# Patient Record
Sex: MALE | Employment: PART TIME | URBAN - METROPOLITAN AREA
[De-identification: names, ages, dates, MRNs, and addresses within clinical notes are randomized per-mention and may not be internally consistent; named-entity substitution may affect disease eponyms.]

---

## 2017-04-29 ENCOUNTER — HOSPITAL ENCOUNTER (EMERGENCY)
Facility: CLINIC | Age: 32
Discharge: HOME | End: 2017-04-29
Attending: PHYSICIAN ASSISTANT | Admitting: PHYSICIAN ASSISTANT
Payer: COMMERCIAL

## 2017-04-29 VITALS
TEMPERATURE: 98.3 F | SYSTOLIC BLOOD PRESSURE: 123 MMHG | DIASTOLIC BLOOD PRESSURE: 73 MMHG | OXYGEN SATURATION: 98 % | RESPIRATION RATE: 18 BRPM | HEART RATE: 71 BPM | WEIGHT: 165 LBS

## 2017-04-29 DIAGNOSIS — R07.0 THROAT PAIN: ICD-10-CM

## 2017-04-29 LAB
DEPRECATED S PYO AG THROAT QL EIA: NORMAL
MICRO REPORT STATUS: NORMAL
SPECIMEN SOURCE: NORMAL

## 2017-04-29 PROCEDURE — 87081 CULTURE SCREEN ONLY: CPT | Performed by: PHYSICIAN ASSISTANT

## 2017-04-29 PROCEDURE — 25000125 ZZHC RX 250: Performed by: PHYSICIAN ASSISTANT

## 2017-04-29 PROCEDURE — 87880 STREP A ASSAY W/OPTIC: CPT | Performed by: PHYSICIAN ASSISTANT

## 2017-04-29 PROCEDURE — 99283 EMERGENCY DEPT VISIT LOW MDM: CPT

## 2017-04-29 PROCEDURE — 25000132 ZZH RX MED GY IP 250 OP 250 PS 637: Performed by: PHYSICIAN ASSISTANT

## 2017-04-29 RX ORDER — DEXAMETHASONE SODIUM PHOSPHATE 10 MG/ML
10 INJECTION, SOLUTION INTRAMUSCULAR; INTRAVENOUS ONCE
Status: COMPLETED | OUTPATIENT
Start: 2017-04-29 | End: 2017-04-29

## 2017-04-29 RX ORDER — IBUPROFEN 200 MG
600 TABLET ORAL ONCE
Status: COMPLETED | OUTPATIENT
Start: 2017-04-29 | End: 2017-04-29

## 2017-04-29 RX ADMIN — DEXAMETHASONE SODIUM PHOSPHATE 10 MG: 10 INJECTION, SOLUTION INTRAMUSCULAR; INTRAVENOUS at 19:49

## 2017-04-29 RX ADMIN — IBUPROFEN 600 MG: 200 TABLET, FILM COATED ORAL at 19:15

## 2017-04-29 ASSESSMENT — ENCOUNTER SYMPTOMS
TROUBLE SWALLOWING: 0
SORE THROAT: 1
COUGH: 0
FEVER: 0

## 2017-04-29 NOTE — ED AVS SNAPSHOT
Emergency Department    [address] [facility] [address]    Phone:  [phone]    Fax:  [phone]                                       Mynor Whittington   MRN: [id number]    Department:   Emergency Department   Date of Visit:  4/29/2017           Patient Information     Date Of Birth          1985        Your diagnoses for this visit were:     Throat pain        You were seen by Shelby Cottrell PA-C.      Follow-up Information     Follow up with [facility].    Why:  As needed    Contact information:    [address] [id number] [phone]          Follow up with  Emergency Department.    Specialty:  EMERGENCY MEDICINE    Why:  If symptoms worsen    Contact information:    [address] [facility] [address] [phone]        Discharge Instructions       Discharge Instructions  Sore Throat  You were seen today for a sore throat.  Most sore throats are caused by a virus. Antibiotics do not help with viral infections, but you can fight off the virus on your own.  In this case, your sore throat would be treated with medications for your pain and fever.    Strep throat is a kind of sore throat caused by Group A streptococcus bacteria.  This type of sore throat is treated with antibiotics.  If you had a rapid test done today for strep throat and it did not show infection, we always do a culture. If the culture shows you have strep throat, we will call you and get you a prescription for antibiotics.    Return to the Emergency Department if:    If you have difficulty breathing.    If you are drooling because you are unable to swallow.    You become dehydrated due to difficulty drinking. Signs of dehydration include weakness, dry mouth, and urinating less than 3 times per day.    If you develop swelling of the neck or tongue.    If you develop a high fever with either headache or stiff neck.    Treatment:      Pain relief  "-- Non-prescription pain medications, such as Tylenol  (acetaminophen) or Motrin , Advil  (ibuprofen) are usually recommended for pain.  Do not use a medicine that you are allergic to, or if your doctor has told you not to use it.   If you have been given a narcotic such as Vicodin  (hydrocodone with acetaminophen), Percocet  (oxycodone with acetaminophen), codeine, do not drive for four hours after you have taken it.  If the narcotic contains Tylenol  (acetaminophen), do not take Tylenol  with it. All narcotics will cause constipation, so eat a high fiber diet.      If you have been placed on antibiotics, watch for signs of allergic reaction.  These include rash, lip swelling, difficulty breathing, wheezing, and dizziness.  If you develop any of these symptoms, stop the antibiotic immediately and go to an Emergency Department or Urgent Care for evaluation.    Probiotics: If you have been given an antibiotic, you may want to also take a probiotic pill or eat yogurt with live cultures. Probiotics have \"good bacteria\" to help your intestines stay healthy. Studies have shown that probiotics help prevent diarrhea and other intestine problems (including C. diff infection) when you take antibiotics. You can buy these without a prescription in the pharmacy section of the store.   If you were given a prescription for medicine here today, be sure to read all of the information (including the package insert) that comes with your prescription.  This will include important information about the medicine, its side effects, and any warnings that you need to know about.  The pharmacist who fills the prescription can provide more information and answer questions you may have about the medicine.  If you have questions or concerns that the pharmacist cannot address, please call or return to the Emergency Department.         Remember that you can always come back to the Emergency Department if you are not able to see your regular " doctor in the amount of time listed above, if you get any new symptoms, or if there is anything that worries you.          24 Hour Appointment Hotline       To make an appointment at any [facility], call [phone] ([phone]). If you don't have a family doctor or clinic, we will help you find one. [facility] are conveniently located to serve the needs of you and your family.             Review of your medicines      Notice     You have not been prescribed any medications.            Procedures and tests performed during your visit     Beta strep group A culture    Rapid strep screen      Orders Needing Specimen Collection     None      Pending Results     Date and Time Order Name Status Description    4/29/2017 1907 Beta strep group A culture In process             Pending Culture Results     Date and Time Order Name Status Description    4/29/2017 1907 Beta strep group A culture In process             Test Results From Your Hospital Stay        4/29/2017  7:22 PM      Component Results     Component    Specimen Description    Throat    Rapid Strep A Screen    NEGATIVE: No Group A streptococcal antigen detected by immunoassay, await   culture report.      Micro Report Status    FINAL 04/29/2017 4/29/2017  7:23 PM                Clinical Quality Measure: Blood Pressure Screening     Your blood pressure was checked while you were in the emergency department today. The last reading we obtained was  BP: 123/73 . Please read the guidelines below about what these numbers mean and what you should do about them.  If your systolic blood pressure (the top number) is less than 120 and your diastolic blood pressure (the bottom number) is less than 80, then your blood pressure is normal. There is nothing more that you need to do about it.  If your systolic blood pressure (the top number) is 120-139 or your diastolic blood pressure (the bottom number) is 80-89, your blood pressure may be higher than  "it should be. You should have your blood pressure rechecked within a year by a primary care provider.  If your systolic blood pressure (the top number) is 140 or greater or your diastolic blood pressure (the bottom number) is 90 or greater, you may have high blood pressure. High blood pressure is treatable, but if left untreated over time it can put you at risk for heart attack, stroke, or kidney failure. You should have your blood pressure rechecked by a primary care provider within the next 4 weeks.  If your provider in the emergency department today gave you specific instructions to follow-up with your doctor or provider even sooner than that, you should follow that instruction and not wait for up to 4 weeks for your follow-up visit.        Thank you for choosing [facility]       Thank you for choosing [facility] for your care. Our goal is always to provide you with excellent care. Hearing back from our patients is one way we can continue to improve our services. Please take a few minutes to complete the written survey that you may receive in the mail after you visit with us. Thank you!        [employer] Information     [employer] lets you send messages to your doctor, view your test results, renew your prescriptions, schedule appointments and more. To sign up, go to [email or web address] . Click on \"Log in\" on the left side of the screen, which will take you to the Welcome page. Then click on \"Sign up Now\" on the right side of the page.     You will be asked to enter the access code listed below, as well as some personal information. Please follow the directions to create your username and password.     Your access code is: [email or web address]  Expires: 2017  7:48 PM     Your access code will  in 90 days. If you need help or a new code, please call your [facility] clinic or [phone].        Care EveryWhere ID     This is your Care EveryWhere ID. This could be used by other organizations to access your [facility] " medical records  [id number]        After Visit Summary       This is your record. Keep this with you and show to your community pharmacist(s) and doctor(s) at your next visit.

## 2017-04-29 NOTE — ED PROVIDER NOTES
History     Chief Complaint:  Pharyngitis    HPI   Mynor Whittington is a 32 year old male who presents with pharyngitis. He states that for the past two days he has and a dry, sore throat, accompanied with congestion. He may have had sick contact with a co-worker, though is unsure of his co-workers symptoms. There has not been any fevers at home, nor ear pain or cough, no difficulty swallowing or voice changes. He denies any other symptoms or concerns.     Allergies:  No Known Drug Allergies      Medications:    The patient is not currently taking any prescribed medications.     Past Medical History:    The patient denies any relevant past medical history.     Past Surgical History:    History reviewed. No pertinent past surgical history.     Family History:    The patient denies any relevant family medical history.     Social History:  The patient presented to the ED alone.  Smoking Status: Never smoker  Smokeless Tobacco: No  Alcohol Use: No   Marital Status:  Single [1]     Review of Systems   Constitutional: Negative for fever.   HENT: Positive for sore throat. Negative for ear pain, trouble swallowing and voice change.    Respiratory: Negative for cough.    All other systems reviewed and are negative.    Physical Exam   Vitals:  Patient Vitals for the past 24 hrs:   BP Temp Temp src Heart Rate SpO2 Weight   04/29/17 1848 123/73 98.3  F (36.8  C) Oral 71 98 % 74.8 kg (165 lb)     Physical Exam  General: Resting comfortably on the gurney.    Head:  The scalp, head and face appear normal.   ENT:  Pupils are equal, round and reactive to light.    Oropharynx is moist.  No uvular deviation. Posterior oropharynx mildly erythematous with mild bilateral tonsillar swelling, but no exudate.       Ear canals patent bilaterally. Left TM clear with no erythema, dullness or effusion. Right TM clear with no erythema, dullness or effusion.   Neck:  Supple, no rigidity noted. Normal ROM.     Trachea midline. No mass  detected.    Lymph: No anterior or posterior cervical lymphadenopathy noted.   Resp:  Non-labored breathing. No tachypnea.     Lung fields clear to auscultation without wheezes or rales.   CV:  Regular rate and rhythm. Normal S1 and S2, no S3 or S4.     No pathological murmur detected.   MS:  Normal muscular tone.   Neuro:  Awake and alert. Speech is clear.   Skin:  No rash or pallor.  Psych: Normal affect. Appropriate interactions.   Emergency Department Course     Laboratory:  Laboratory findings were communicated with the patient who voiced understanding of the findings.  Rapid Strep: Negative  Strep Culture: Pending     Interventions:  1915 Ibuprofen 600 mg PO  1942 Decadron 10 mg PO    Emergency Department Course:  Nursing notes and vitals reviewed.  I performed an exam of the patient as documented above.     I discussed the treatment plan with the patient. They expressed understanding of this plan and consented to discharge. They will be discharged home with instructions for care and follow up. In addition, the patient will return to the emergency department if their symptoms persist, worsen, if new symptoms arise or if there is any concern.  All questions were answered.    I personally reviewed the laboratory results with the Patient and answered all related questions prior to discharge.    Impression & Plan      Medical Decision Making:  Mynor Whittington is a 32 year old male who presents for evaluation of a sore throat and clinical evidence of pharyngitis.  The rapid strep test is negative, and formal culture has been set up in the lab. There is no clinical evidence of peritonsillar abscess, retropharyngeal abscess, Lemierre's Syndrome, epiglottis, or Leobardo's angina. Patient has a concurrent URI, making viral etiologies more likely.  If sore throat persists, mono testing indicated.   I have recommended treatment with analgesics, and we will await formal culture results.  If the culture is  positive, an ED physician will call the patient to initiate anti-microbial therapy. Return if increasing pain, change in voice, neck pain, vomiting, fever, or shortness of breath. Follow-up with primary physician if not improving in 3-5 days. Given well appearance, I would not test further for other etiologies of serious bacterial infections. I discussed the results, plan and any additional questions with the patient. He verbalized understanding and agreement with the plan.        Diagnosis:    ICD-10-CM    1. Throat pain R07.0 Beta strep group A culture        Disposition:   Discharge to home    Scribe Disclosure:  I, Junior Reilly, am serving as a scribe at 6:54 PM on 4/29/2017 to document services personally performed by Shelby Cottrell PA-C, based on my observations and the provider's statements to me.   4/29/2017    EMERGENCY DEPARTMENT       Shelby Cottrell PA-C  04/30/17 8453

## 2017-04-29 NOTE — ED AVS SNAPSHOT
Emergency Department    [address] [facility] [address]    Phone:  [phone]    Fax:  [phone]                                       Mynor Whittington   MRN: [id number]    Department:   Emergency Department   Date of Visit:  4/29/2017           After Visit Summary Signature Page     I have received my discharge instructions, and my questions have been answered. I have discussed any challenges I see with this plan with the nurse or doctor.    ..........................................................................................................................................  Patient/Patient Representative Signature      ..........................................................................................................................................  Patient Representative Print Name and Relationship to Patient    ..................................................               ................................................  Date                                            Time    ..........................................................................................................................................  Reviewed by Signature/Title    ...................................................              ..............................................  Date                                                            Time

## 2017-04-30 ASSESSMENT — ENCOUNTER SYMPTOMS: VOICE CHANGE: 0

## 2017-04-30 NOTE — DISCHARGE INSTRUCTIONS
Discharge Instructions  Sore Throat  You were seen today for a sore throat.  Most sore throats are caused by a virus. Antibiotics do not help with viral infections, but you can fight off the virus on your own.  In this case, your sore throat would be treated with medications for your pain and fever.    Strep throat is a kind of sore throat caused by Group A streptococcus bacteria.  This type of sore throat is treated with antibiotics.  If you had a rapid test done today for strep throat and it did not show infection, we always do a culture. If the culture shows you have strep throat, we will call you and get you a prescription for antibiotics.    Return to the Emergency Department if:    If you have difficulty breathing.    If you are drooling because you are unable to swallow.    You become dehydrated due to difficulty drinking. Signs of dehydration include weakness, dry mouth, and urinating less than 3 times per day.    If you develop swelling of the neck or tongue.    If you develop a high fever with either headache or stiff neck.    Treatment:      Pain relief -- Non-prescription pain medications, such as Tylenol  (acetaminophen) or Motrin , Advil  (ibuprofen) are usually recommended for pain.  Do not use a medicine that you are allergic to, or if your doctor has told you not to use it.   If you have been given a narcotic such as Vicodin  (hydrocodone with acetaminophen), Percocet  (oxycodone with acetaminophen), codeine, do not drive for four hours after you have taken it.  If the narcotic contains Tylenol  (acetaminophen), do not take Tylenol  with it. All narcotics will cause constipation, so eat a high fiber diet.      If you have been placed on antibiotics, watch for signs of allergic reaction.  These include rash, lip swelling, difficulty breathing, wheezing, and dizziness.  If you develop any of these symptoms, stop the antibiotic immediately and go to an Emergency Department or Urgent Care for  "evaluation.    Probiotics: If you have been given an antibiotic, you may want to also take a probiotic pill or eat yogurt with live cultures. Probiotics have \"good bacteria\" to help your intestines stay healthy. Studies have shown that probiotics help prevent diarrhea and other intestine problems (including C. diff infection) when you take antibiotics. You can buy these without a prescription in the pharmacy section of the store.   If you were given a prescription for medicine here today, be sure to read all of the information (including the package insert) that comes with your prescription.  This will include important information about the medicine, its side effects, and any warnings that you need to know about.  The pharmacist who fills the prescription can provide more information and answer questions you may have about the medicine.  If you have questions or concerns that the pharmacist cannot address, please call or return to the Emergency Department.         Remember that you can always come back to the Emergency Department if you are not able to see your regular doctor in the amount of time listed above, if you get any new symptoms, or if there is anything that worries you.        "

## 2017-05-01 LAB
BACTERIA SPEC CULT: NORMAL
MICRO REPORT STATUS: NORMAL
SPECIMEN SOURCE: NORMAL

## 2021-01-14 ENCOUNTER — HOSPITAL ENCOUNTER (EMERGENCY)
Facility: CLINIC | Age: 36
Discharge: HOME | End: 2021-01-14
Attending: EMERGENCY MEDICINE | Admitting: EMERGENCY MEDICINE
Payer: COMMERCIAL

## 2021-01-14 VITALS
DIASTOLIC BLOOD PRESSURE: 95 MMHG | HEART RATE: 89 BPM | TEMPERATURE: 98.4 F | SYSTOLIC BLOOD PRESSURE: 128 MMHG | RESPIRATION RATE: 18 BRPM | OXYGEN SATURATION: 96 % | HEIGHT: 66 IN | BODY MASS INDEX: 26.63 KG/M2

## 2021-01-14 DIAGNOSIS — N30.01 ACUTE CYSTITIS WITH HEMATURIA: ICD-10-CM

## 2021-01-14 LAB
ALBUMIN UR-MCNC: 300 MG/DL
APPEARANCE UR: ABNORMAL
BILIRUB UR QL STRIP: NEGATIVE
COLOR UR AUTO: ABNORMAL
GLUCOSE UR STRIP-MCNC: NEGATIVE MG/DL
HGB UR QL STRIP: ABNORMAL
KETONES UR STRIP-MCNC: NEGATIVE MG/DL
LEUKOCYTE ESTERASE UR QL STRIP: ABNORMAL
MUCOUS THREADS #/AREA URNS LPF: PRESENT /LPF
NITRATE UR QL: NEGATIVE
PH UR STRIP: 6.5 PH (ref 5–7)
RBC #/AREA URNS AUTO: >182 /HPF (ref 0–2)
SOURCE: ABNORMAL
SP GR UR STRIP: 1.02 (ref 1–1.03)
UROBILINOGEN UR STRIP-MCNC: 0 MG/DL (ref 0–2)
WBC #/AREA URNS AUTO: >182 /HPF (ref 0–5)
WBC CLUMPS #/AREA URNS HPF: PRESENT /HPF

## 2021-01-14 PROCEDURE — 87591 N.GONORRHOEAE DNA AMP PROB: CPT | Performed by: EMERGENCY MEDICINE

## 2021-01-14 PROCEDURE — 87186 SC STD MICRODIL/AGAR DIL: CPT | Performed by: EMERGENCY MEDICINE

## 2021-01-14 PROCEDURE — 250N000013 HC RX MED GY IP 250 OP 250 PS 637: Performed by: EMERGENCY MEDICINE

## 2021-01-14 PROCEDURE — 87086 URINE CULTURE/COLONY COUNT: CPT | Performed by: EMERGENCY MEDICINE

## 2021-01-14 PROCEDURE — 81001 URINALYSIS AUTO W/SCOPE: CPT | Performed by: EMERGENCY MEDICINE

## 2021-01-14 PROCEDURE — 87491 CHLMYD TRACH DNA AMP PROBE: CPT | Performed by: EMERGENCY MEDICINE

## 2021-01-14 PROCEDURE — 87088 URINE BACTERIA CULTURE: CPT | Performed by: EMERGENCY MEDICINE

## 2021-01-14 PROCEDURE — 99283 EMERGENCY DEPT VISIT LOW MDM: CPT

## 2021-01-14 RX ORDER — PHENAZOPYRIDINE HYDROCHLORIDE 200 MG/1
200 TABLET, FILM COATED ORAL 3 TIMES DAILY
Qty: 9 TABLET | Refills: 0 | Status: SHIPPED | OUTPATIENT
Start: 2021-01-14 | End: 2021-01-17

## 2021-01-14 RX ORDER — IBUPROFEN 600 MG/1
600 TABLET, FILM COATED ORAL ONCE
Status: COMPLETED | OUTPATIENT
Start: 2021-01-14 | End: 2021-01-14

## 2021-01-14 RX ORDER — PHENAZOPYRIDINE HYDROCHLORIDE 100 MG/1
100 TABLET, FILM COATED ORAL ONCE
Status: COMPLETED | OUTPATIENT
Start: 2021-01-14 | End: 2021-01-14

## 2021-01-14 RX ORDER — CEPHALEXIN 500 MG/1
500 CAPSULE ORAL 3 TIMES DAILY
Qty: 21 CAPSULE | Refills: 0 | Status: SHIPPED | OUTPATIENT
Start: 2021-01-14 | End: 2021-01-21

## 2021-01-14 RX ADMIN — PHENAZOPYRIDINE HYDROCHLORIDE 100 MG: 100 TABLET ORAL at 08:44

## 2021-01-14 RX ADMIN — IBUPROFEN 600 MG: 600 TABLET, FILM COATED ORAL at 08:44

## 2021-01-14 ASSESSMENT — ENCOUNTER SYMPTOMS
HEMATURIA: 0
VOMITING: 0
BACK PAIN: 1
FEVER: 1

## 2021-01-14 NOTE — ED TRIAGE NOTES
Burning with urination, low abdominal pain and intermittent fever since Tuesday. Neg COVID test done yesterday.

## 2021-01-14 NOTE — ED AVS SNAPSHOT
[facility] Emergency Dept  [address] [facility] [address]  Phone: [phone]  Fax: [phone]                                    Mynor Whittington   MRN: [id number]    Department: [facility] Emergency Dept   Date of Visit: 1/14/2021           After Visit Summary Signature Page    I have received my discharge instructions, and my questions have been answered. I have discussed any challenges I see with this plan with the nurse or doctor.    ..........................................................................................................................................  Patient/Patient Representative Signature      ..........................................................................................................................................  Patient Representative Print Name and Relationship to Patient    ..................................................               ................................................  Date                                   Time    ..........................................................................................................................................  Reviewed by Signature/Title    ...................................................              ..............................................  Date                                               Time          22EPIC Rev 08/18
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
## 2021-01-15 LAB
BACTERIA SPEC CULT: ABNORMAL
C TRACH DNA SPEC QL NAA+PROBE: NEGATIVE
N GONORRHOEA DNA SPEC QL NAA+PROBE: NEGATIVE
SPECIMEN SOURCE: ABNORMAL
SPECIMEN SOURCE: NORMAL
SPECIMEN SOURCE: NORMAL

## 2021-01-15 NOTE — RESULT ENCOUNTER NOTE
Emergency Dept/Urgent Care discharge antibiotic: Cephalexin (Keflex) 500 mg capsule, 1 capsule (500 mg) by mouth 3 times daily for 7 days.  Date of Rx (If Applicable): 1/14/21  Recommendations per [place] ED Lab result protocol - Urine culture protocol.

## 2021-01-15 NOTE — RESULT ENCOUNTER NOTE
Final result for both N. Gonorrhoeae PCR and Chlamydia Trachomatis PCR are NEGATIVE.  No treatment or change in treatment per [place] ED Lab Result protocol.

## 2021-01-18 ENCOUNTER — TELEPHONE (OUTPATIENT)
Dept: EMERGENCY MEDICINE | Facility: CLINIC | Age: 36
End: 2021-01-18

## 2021-01-18 NOTE — TELEPHONE ENCOUNTER
"[facility] Emergency Department/Urgent Care Lab result notification [Positive for uti and bacteria is susceptible to antibiotic]:    Reason for call:   Notify of Final urine culture result, confirm patient is taking antibiotic, assess symptoms, and advise per Emergency Dept/Urgent Care discharge instructions and Emergency Dept urine culture protocol.    Lab Result & Date of Final Report [copied from Result Note]:    Final Urine Culture Report on 1/15/21  Emergency Dept/Urgent Care discharge antibiotic prescribed: Cephalexin (Keflex) 500 mg capsule, 1 capsule (500 mg) by mouth 3 times daily for 7 days.  #1. Bacteria, 50,000 to 100,000 colonies/mL Escherichia coli , is SUSCEPTIBLE to Antibiotic.    As per [place] ED Lab Result protocol, no change in antibiotic therapy.    Current symptoms (include time patient called):    5:20PM: Spoke with patient. He states he is alittle better. Still having \"alittle\" pain with urination and occasional pain in the stomach. \"I'm not normal.\" No fever.     Recommendations/Instructions:   Patient notified of lab result and treatment recommendation.   Take antibiotic as directed by the Emergency Dept/Urgent Care Provider.  Advised to follow up with the PCP as directed by the ED provider. The patient states understanding of the information given and will contact his PCP tomorrow. He has no further questions.     UTI prevention/education reviewed with patient [Yes/No]:  Yes    Please contact you PCP or return to the Emergency Department if your:    Symptoms worsen or other concerning symptoms    Peggy Bhardwaj RN  [facility] RN  Lung Nodule and ED Lab Result RN  Epic pool (ED late result f/u RN): [id number]  [facility] INCIDENTAL RADIOLOGY F/U NURSES: [id number]  Ph# [phone]            "

## 2025-01-03 ENCOUNTER — HOSPITAL ENCOUNTER (EMERGENCY)
Facility: CLINIC | Age: 40
Discharge: HOME | End: 2025-01-03
Attending: EMERGENCY MEDICINE | Admitting: EMERGENCY MEDICINE
Payer: COMMERCIAL

## 2025-01-03 ENCOUNTER — APPOINTMENT (OUTPATIENT)
Dept: GENERAL RADIOLOGY | Facility: CLINIC | Age: 40
End: 2025-01-03
Attending: EMERGENCY MEDICINE
Payer: COMMERCIAL

## 2025-01-03 VITALS
DIASTOLIC BLOOD PRESSURE: 89 MMHG | SYSTOLIC BLOOD PRESSURE: 116 MMHG | HEART RATE: 86 BPM | TEMPERATURE: 98.6 F | OXYGEN SATURATION: 94 % | RESPIRATION RATE: 16 BRPM

## 2025-01-03 DIAGNOSIS — I49.8 BRUGADA PATTERN ON ELECTROCARDIOGRAM: ICD-10-CM

## 2025-01-03 DIAGNOSIS — J10.1 INFLUENZA A: ICD-10-CM

## 2025-01-03 LAB
ALBUMIN SERPL BCG-MCNC: 4.4 G/DL (ref 3.5–5.2)
ALP SERPL-CCNC: 100 U/L (ref 40–150)
ALT SERPL W P-5'-P-CCNC: 37 U/L (ref 0–70)
ANION GAP SERPL CALCULATED.3IONS-SCNC: 12 MMOL/L (ref 7–15)
AST SERPL W P-5'-P-CCNC: 22 U/L (ref 0–45)
BASOPHILS # BLD AUTO: 0 10E3/UL (ref 0–0.2)
BASOPHILS NFR BLD AUTO: 0 %
BILIRUB SERPL-MCNC: 0.4 MG/DL
BUN SERPL-MCNC: 14.5 MG/DL (ref 6–20)
CALCIUM SERPL-MCNC: 8.9 MG/DL (ref 8.8–10.4)
CHLORIDE SERPL-SCNC: 103 MMOL/L (ref 98–107)
CREAT SERPL-MCNC: 0.96 MG/DL (ref 0.67–1.17)
EGFRCR SERPLBLD CKD-EPI 2021: >90 ML/MIN/1.73M2
EOSINOPHIL # BLD AUTO: 0.2 10E3/UL (ref 0–0.7)
EOSINOPHIL NFR BLD AUTO: 3 %
ERYTHROCYTE [DISTWIDTH] IN BLOOD BY AUTOMATED COUNT: 12.9 % (ref 10–15)
FLUAV RNA SPEC QL NAA+PROBE: POSITIVE
FLUBV RNA RESP QL NAA+PROBE: NEGATIVE
GLUCOSE SERPL-MCNC: 103 MG/DL (ref 70–99)
HCO3 SERPL-SCNC: 24 MMOL/L (ref 22–29)
HCT VFR BLD AUTO: 44.2 % (ref 40–53)
HGB BLD-MCNC: 15.3 G/DL (ref 13.3–17.7)
IMM GRANULOCYTES # BLD: 0 10E3/UL
IMM GRANULOCYTES NFR BLD: 1 %
LYMPHOCYTES # BLD AUTO: 0.6 10E3/UL (ref 0.8–5.3)
LYMPHOCYTES NFR BLD AUTO: 11 %
MAGNESIUM SERPL-MCNC: 2.1 MG/DL (ref 1.7–2.3)
MCH RBC QN AUTO: 28.6 PG (ref 26.5–33)
MCHC RBC AUTO-ENTMCNC: 34.6 G/DL (ref 31.5–36.5)
MCV RBC AUTO: 83 FL (ref 78–100)
MONOCYTES # BLD AUTO: 0.6 10E3/UL (ref 0–1.3)
MONOCYTES NFR BLD AUTO: 11 %
NEUTROPHILS # BLD AUTO: 4.1 10E3/UL (ref 1.6–8.3)
NEUTROPHILS NFR BLD AUTO: 74 %
NRBC # BLD AUTO: 0 10E3/UL
NRBC BLD AUTO-RTO: 0 /100
NT-PROBNP SERPL-MCNC: <36 PG/ML (ref 0–450)
PLATELET # BLD AUTO: 210 10E3/UL (ref 150–450)
POTASSIUM SERPL-SCNC: 3.7 MMOL/L (ref 3.4–5.3)
PROT SERPL-MCNC: 7.4 G/DL (ref 6.4–8.3)
RBC # BLD AUTO: 5.35 10E6/UL (ref 4.4–5.9)
RSV RNA SPEC NAA+PROBE: NEGATIVE
S PYO DNA THROAT QL NAA+PROBE: NOT DETECTED
SARS-COV-2 RNA RESP QL NAA+PROBE: NEGATIVE
SODIUM SERPL-SCNC: 139 MMOL/L (ref 135–145)
TROPONIN T SERPL HS-MCNC: <6 NG/L
WBC # BLD AUTO: 5.6 10E3/UL (ref 4–11)

## 2025-01-03 PROCEDURE — 87651 STREP A DNA AMP PROBE: CPT | Performed by: STUDENT IN AN ORGANIZED HEALTH CARE EDUCATION/TRAINING PROGRAM

## 2025-01-03 PROCEDURE — 87637 SARSCOV2&INF A&B&RSV AMP PRB: CPT | Performed by: EMERGENCY MEDICINE

## 2025-01-03 PROCEDURE — 85048 AUTOMATED LEUKOCYTE COUNT: CPT | Performed by: EMERGENCY MEDICINE

## 2025-01-03 PROCEDURE — 36415 COLL VENOUS BLD VENIPUNCTURE: CPT | Performed by: EMERGENCY MEDICINE

## 2025-01-03 PROCEDURE — 250N000013 HC RX MED GY IP 250 OP 250 PS 637: Performed by: EMERGENCY MEDICINE

## 2025-01-03 PROCEDURE — 71046 X-RAY EXAM CHEST 2 VIEWS: CPT

## 2025-01-03 PROCEDURE — 83880 ASSAY OF NATRIURETIC PEPTIDE: CPT | Performed by: EMERGENCY MEDICINE

## 2025-01-03 PROCEDURE — 93005 ELECTROCARDIOGRAM TRACING: CPT

## 2025-01-03 PROCEDURE — 250N000013 HC RX MED GY IP 250 OP 250 PS 637: Performed by: STUDENT IN AN ORGANIZED HEALTH CARE EDUCATION/TRAINING PROGRAM

## 2025-01-03 PROCEDURE — 83735 ASSAY OF MAGNESIUM: CPT | Performed by: EMERGENCY MEDICINE

## 2025-01-03 PROCEDURE — 87651 STREP A DNA AMP PROBE: CPT | Performed by: EMERGENCY MEDICINE

## 2025-01-03 PROCEDURE — 85014 HEMATOCRIT: CPT | Performed by: EMERGENCY MEDICINE

## 2025-01-03 PROCEDURE — 84484 ASSAY OF TROPONIN QUANT: CPT | Performed by: EMERGENCY MEDICINE

## 2025-01-03 PROCEDURE — 80053 COMPREHEN METABOLIC PANEL: CPT | Performed by: EMERGENCY MEDICINE

## 2025-01-03 PROCEDURE — 99285 EMERGENCY DEPT VISIT HI MDM: CPT | Mod: 25

## 2025-01-03 RX ORDER — IBUPROFEN 600 MG/1
600 TABLET, FILM COATED ORAL ONCE
Status: COMPLETED | OUTPATIENT
Start: 2025-01-03 | End: 2025-01-03

## 2025-01-03 RX ORDER — OSELTAMIVIR PHOSPHATE 75 MG/1
75 CAPSULE ORAL 2 TIMES DAILY
Qty: 10 CAPSULE | Refills: 0 | Status: SHIPPED | OUTPATIENT
Start: 2025-01-03 | End: 2025-01-08

## 2025-01-03 RX ORDER — ACETAMINOPHEN 500 MG
1000 TABLET ORAL 3 TIMES DAILY PRN
Qty: 30 TABLET | Refills: 0 | Status: SHIPPED | OUTPATIENT
Start: 2025-01-03 | End: 2025-01-10

## 2025-01-03 RX ORDER — MOMETASONE FUROATE MONOHYDRATE 50 UG/1
1-2 SPRAY, METERED NASAL
COMMUNITY
Start: 2024-09-04

## 2025-01-03 RX ORDER — KETOCONAZOLE 20 MG/G
CREAM TOPICAL 2 TIMES DAILY
COMMUNITY
Start: 2024-12-27

## 2025-01-03 RX ORDER — ACETAMINOPHEN 500 MG
1000 TABLET ORAL ONCE
Status: COMPLETED | OUTPATIENT
Start: 2025-01-03 | End: 2025-01-03

## 2025-01-03 RX ORDER — IBUPROFEN 600 MG/1
600 TABLET, FILM COATED ORAL EVERY 6 HOURS PRN
Qty: 30 TABLET | Refills: 0 | Status: SHIPPED | OUTPATIENT
Start: 2025-01-03

## 2025-01-03 RX ORDER — CETIRIZINE HYDROCHLORIDE 10 MG/1
10 TABLET ORAL
COMMUNITY
Start: 2024-09-04 | End: 2025-01-13

## 2025-01-03 RX ORDER — OSELTAMIVIR PHOSPHATE 75 MG/1
75 CAPSULE ORAL ONCE
Status: COMPLETED | OUTPATIENT
Start: 2025-01-03 | End: 2025-01-03

## 2025-01-03 RX ORDER — OLOPATADINE HYDROCHLORIDE 2 MG/ML
1 SOLUTION/ DROPS OPHTHALMIC
COMMUNITY
Start: 2024-09-04

## 2025-01-03 RX ORDER — ALBUTEROL SULFATE 90 UG/1
2 INHALANT RESPIRATORY (INHALATION) EVERY 4 HOURS PRN
COMMUNITY
Start: 2024-09-04

## 2025-01-03 RX ADMIN — OSELTAMIVIR PHOSPHATE 75 MG: 75 CAPSULE ORAL at 20:15

## 2025-01-03 RX ADMIN — IBUPROFEN 600 MG: 600 TABLET ORAL at 17:42

## 2025-01-03 RX ADMIN — ACETAMINOPHEN 1000 MG: 500 TABLET, FILM COATED ORAL at 20:15

## 2025-01-03 ASSESSMENT — ACTIVITIES OF DAILY LIVING (ADL)
ADLS_ACUITY_SCORE: 41

## 2025-01-03 ASSESSMENT — COLUMBIA-SUICIDE SEVERITY RATING SCALE - C-SSRS
6. HAVE YOU EVER DONE ANYTHING, STARTED TO DO ANYTHING, OR PREPARED TO DO ANYTHING TO END YOUR LIFE?: NO
1. IN THE PAST MONTH, HAVE YOU WISHED YOU WERE DEAD OR WISHED YOU COULD GO TO SLEEP AND NOT WAKE UP?: NO
2. HAVE YOU ACTUALLY HAD ANY THOUGHTS OF KILLING YOURSELF IN THE PAST MONTH?: NO

## 2025-01-03 NOTE — ED TRIAGE NOTES
Coughing/sneezing/congestion since yesterday.  Body aches.  Came home from work tonight and told his wife he felt out of breath.  Also reports fever.   Hx asthma.

## 2025-01-03 NOTE — Clinical Note
Mynor Whittington was seen and treated in our emergency department on 1/3/2025.    He has influenza and cannot return to work for the next 5 days.  Return after that is based on having 24 hours free of fevers without the use of fever reducing medications.    He was referred for urgent cardiology follow-up due to an abnormality found during his evaluation today.     Sincerely,     [facility] Emergency Dept

## 2025-01-04 NOTE — ED PROVIDER NOTES
Emergency Department Note      History of Present Illness     Chief Complaint   Shortness of Breath    HPI   Mynor Whittington is a 39 year old male with a history of asthma presenting with shortness of breath. The patient notes his ill symptoms started yesterday (1/2/25). Mynor feels his shortness of breath is associated with coughing. He endorses one episode of dizziness yesterday and an episode of dizziness today.  Has never passed out.  The patient has been using ibuprofen and inhalers to manage his symptoms. No recent vomiting or diarrhea. No personal history of heart disease or smoking. No family history of sudden cardiac death.     Independent Historian   Female  as detailed above. She provided intermittent Setswana interpretation.    Review of External Notes   Recent office visit 12/27 w/prediabetes, no chest symptoms    Past Medical History     Medical History and Problem List   Asthma   Dyslipidemia   Depression   Hyperlipidemia   Prediabetes     Medications   Albuterol   Fenofibrate   Gemfibrozil   Metformin     Physical Exam     Patient Vitals for the past 24 hrs:   BP Temp Temp src Pulse Resp SpO2   01/03/25 1837 (!) 141/92 -- -- 98 21 95 %   01/03/25 1728 (!) 160/78 99.8  F (37.7  C) Oral 102 24 98 %     Physical Exam  Eyes:               Sclera white; Pupils are equal and round  ENT:                External ears and nares normal  CV:                  Rate as above with regular rhythm   Resp:               Breath sounds clear and equal bilaterally                          Non-labored, no retractions or accessory muscle use  MS:                  Moves all extremities  Skin:                Warm and dry, feels hot  Neuro:             Speech is normal and fluent. No apparent deficit.    Diagnostics     Lab Results   Labs Ordered and Resulted from Time of ED Arrival to Time of ED Departure   INFLUENZA A/B, RSV AND SARS-COV2 PCR - Abnormal       Result Value    Influenza A PCR Positive  (*)     Influenza B PCR Negative      RSV PCR Negative      SARS CoV2 PCR Negative     COMPREHENSIVE METABOLIC PANEL - Abnormal    Sodium 139      Potassium 3.7      Carbon Dioxide (CO2) 24      Anion Gap 12      Urea Nitrogen 14.5      Creatinine 0.96      GFR Estimate >90      Calcium 8.9      Chloride 103      Glucose 103 (*)     Alkaline Phosphatase 100      AST 22      ALT 37      Protein Total 7.4      Albumin 4.4      Bilirubin Total 0.4     CBC WITH PLATELETS AND DIFFERENTIAL - Abnormal    WBC Count 5.6      RBC Count 5.35      Hemoglobin 15.3      Hematocrit 44.2      MCV 83      MCH 28.6      MCHC 34.6      RDW 12.9      Platelet Count 210      % Neutrophils 74      % Lymphocytes 11      % Monocytes 11      % Eosinophils 3      % Basophils 0      % Immature Granulocytes 1      NRBCs per 100 WBC 0      Absolute Neutrophils 4.1      Absolute Lymphocytes 0.6 (*)     Absolute Monocytes 0.6      Absolute Eosinophils 0.2      Absolute Basophils 0.0      Absolute Immature Granulocytes 0.0      Absolute NRBCs 0.0     MAGNESIUM - Normal    Magnesium 2.1     TROPONIN T, HIGH SENSITIVITY - Normal    Troponin T, High Sensitivity <6     NT PROBNP INPATIENT - Normal    N terminal Pro BNP Inpatient <36     GROUP A STREPTOCOCCUS PCR THROAT SWAB - Normal    Group A strep by PCR Not Detected       Imaging   XR Chest 2 Views   Final Result   IMPRESSION: Negative chest.        EKG   ECG taken at 1720, ECG read at 1833  Sinus tachycardia   Left axis deviation   Brugada pattern, type 1   Abnormal ECG   No prior for comparison  Rate 119 bpm. WI interval 160 ms. QRS duration 90 ms. QT/QTc 316/444 ms. P-R-T axes 51 -64 48.    Independent Interpretation   CXR: No pneumothorax, infiltrate, or pleural effusion.    ED Course      Medications Administered   Medications   acetaminophen (TYLENOL) tablet 1,000 mg (has no administration in time range)   oseltamivir (TAMIFLU) capsule 75 mg (has no administration in time range)   ibuprofen  (ADVIL/MOTRIN) tablet 600 mg (600 mg Oral $Given 1/3/25 1224)     Procedures   Procedures     Discussion of Management   None    ED Course   ED Course as of 01/05/25 1904 Fri Jan 03, 2025 1823 I obtained the history and examined the patient as noted above.      1829 I rechecked and updated the patient. His viral swabs returned and the patient tested positive for Influenza A.    2153 I rechecked and updated the patient. We are still waiting on the consult from cardiology, and he is agreeable to continuing to wait.      2207 I spoke with Dr. Tucker, cardiology.  Based on discussion, has Brugada pattern right now but not Brugada syndrome.  Would benefit from being hydrated, aggressive fever control, and rapid outpatient follow-up with EP.  No acute cardiology management would happen for this inpatient.     Additional Documentation  None    Medical Decision Making / Diagnosis     CMS Diagnoses: None    MIPS       None    MDM   EKG is consistent with Brugada and I agree with the computer read of this possibility. He has not had previous syncopal episodes and denies a known family history of sudden potentially cardiac death. However with his age and fever and infectious symptoms he is at a higher risk of arrhythmia in association with this. The dizziness that he was having earlier today is also concerning for the possibility of arrhythmia. Infectious evaluation was notable for influenza A and Tamiflu was ordered. After getting his labs and x-ray back cardiology will be consulted to discuss inpatient versus rapid outpatient follow-up of Brugada.  Discussed as above.  Urgent referral for cardiology follow-up.  Dual antipyretics.  Tamiflu.  Return immediately if passes out.    Disposition   The patient was discharged.     Diagnosis     ICD-10-CM    1. Influenza A  J10.1       2. Brugada pattern on electrocardiogram  I49.8 Adult Cardiology al [facility] Referral           Discharge Medications   Discharge Medication List  as of 1/3/2025 10:39 PM        START taking these medications    Details   acetaminophen (TYLENOL) 500 MG tablet Take 2 tablets (1,000 mg) by mouth 3 times daily as needed for fever., Disp-30 tablet, R-0, E-Prescribe      ibuprofen (ADVIL/MOTRIN) 600 MG tablet Take 1 tablet (600 mg) by mouth every 6 hours as needed for fever., Disp-30 tablet, R-0, E-Prescribe      oseltamivir (TAMIFLU) 75 MG capsule Take 1 capsule (75 mg) by mouth 2 times daily for 5 days., Disp-10 capsule, R-0, E-Prescribe           Scribe Disclosure:  I, Jillian Sandoval, am serving as a scribe at 6:32 PM on 1/3/2025 to document services personally performed by Zainab Carnes MD based on my observations and the provider's statements to me.        Zainab Carnes MD  01/05/25 [id number]

## 2025-01-05 LAB
ATRIAL RATE - MUSE: 119 BPM
DIASTOLIC BLOOD PRESSURE - MUSE: NORMAL MMHG
INTERPRETATION ECG - MUSE: NORMAL
P AXIS - MUSE: 51 DEGREES
PR INTERVAL - MUSE: 160 MS
QRS DURATION - MUSE: 90 MS
QT - MUSE: 316 MS
QTC - MUSE: 444 MS
R AXIS - MUSE: -64 DEGREES
SYSTOLIC BLOOD PRESSURE - MUSE: NORMAL MMHG
T AXIS - MUSE: 48 DEGREES
VENTRICULAR RATE- MUSE: 119 BPM

## 2025-01-13 ENCOUNTER — OFFICE VISIT (OUTPATIENT)
Dept: CARDIOLOGY | Facility: CLINIC | Age: 40
End: 2025-01-13
Attending: EMERGENCY MEDICINE
Payer: COMMERCIAL

## 2025-01-13 VITALS
SYSTOLIC BLOOD PRESSURE: 100 MMHG | HEIGHT: 65 IN | BODY MASS INDEX: 29.22 KG/M2 | HEART RATE: 73 BPM | DIASTOLIC BLOOD PRESSURE: 67 MMHG | WEIGHT: 175.4 LBS

## 2025-01-13 DIAGNOSIS — I49.8 BRUGADA PATTERN ON ELECTROCARDIOGRAM: Primary | ICD-10-CM

## 2025-01-13 PROCEDURE — 99207 PR NO CHARGE LOS: CPT | Performed by: INTERNAL MEDICINE

## 2025-01-13 NOTE — PROGRESS NOTES
SERVICE DATE: 2025      DIAGNOSES/ASSESSMENT:    Incidental finding of asymptomatic type I Brugada pattern on recent EKG noted during fever from influenza and mild hypokalemia of 3.7.  No prior EKG for comparison.  Needs to see an electrophysiologist.  No personal history of palpitations or syncope but reports near syncope during recent influenza.  No known family history of arrhythmia, sudden death or cardiac problems.  Type 2 Diabetes Mellitus. Managed with metformin. Creatinine 0.9.    PLAN:    Refer to electrophysiologist for Brugada Syndrome evaluation.  Discussed with patient that he might require further testing including cardiac imaging such as echocardiogram, provocative testing.  Advised him to avoid over-the-counter decongestions (typically does not use any).  Aggressively manage fever with antipyretics such as Tylenol.      Mickie Rolon MD  Cardiology      REFERRING PROVIDER:  Zainab Carnes MD  [facility]  [address] [place] [address]      REASON FOR VISIT:  Type I Brugada pattern on EKG.    HISTORY OF PRESENT ILLNESS:  Mynor Whittington is a 39-year-old male, new to cardiology.  Following a recent ER visit for influenza, he was incidentally noted to have type I Brugada pattern on electrocardiogram, and referred to see heart rhythm specialist.  Due to scheduling error, patient has an appointment with me in General Cardiology today.    Patient is known to have asthma, type 2 diabetes mellitus (on metformin).  No prior cardiac diagnosis.  He is originally from [facility] [facility], works as a .  No syncope reported, though he felt close to fainting during his recent influenza episode.  No tobacco, alcohol or drug use.    Family history significant for father who  from alcohol-related issues. No sudden deaths or known heart problems in the family. Patient has 6 siblings, all alive with no known medical issues.  Most of [place] family [place] in  [place]    BP: 100/67 mmHg, HR: 73, Height: 5'5\", Weight: 175 lbs, BMI: 29. Regular heart sounds, no murmurs, no carotid bruits. Lungs clear. No LE edema.    EKG (1/3/2025): Sinus tachycardia 120 (done in the ER when he had acute influenza), left axis deviation, type 1 Brugada pattern, QTc 440 ms.  No prior EKG for comparison.    Labs (1/3/2025): Normal renal panel with a creatinine of 0.9, potassium 3.7, sodium 139, normal CBC with a hemoglobin of 15.3.      New patient.  No charge visit.      This note was completed in part using dictation via the Dragon voice recognition software. Some word and grammatical errors may occur and must be interpreted in the appropriate clinical context. If there are any questions pertaining to this issue, please contact me for further clarification.     Orders Placed This Encounter   Procedures    Follow-Up with Cardiology         Vitals: /67   Pulse 73   Ht 1.651 m (5' 5\")   Wt 79.6 kg (175 lb 6.4 oz)   BMI 29.19 kg/m        CURRENT MEDICATIONS:  Current Outpatient Medications   Medication Sig Dispense Refill    albuterol (PROAIR HFA/PROVENTIL HFA/VENTOLIN HFA) 108 (90 Base) MCG/ACT inhaler Inhale 2 puffs into the lungs every 4 hours as needed.      ibuprofen (ADVIL/MOTRIN) 600 MG tablet Take 1 tablet (600 mg) by mouth every 6 hours as needed for fever. 30 tablet 0    ketoconazole (NIZORAL) 2 % external cream Apply topically 2 times daily.      metFORMIN (GLUCOPHAGE) 500 MG tablet Take 500 mg by mouth.      mometasone (NASONEX) 50 MCG/ACT nasal spray 1-2 sprays.      olopatadine (PATADAY) 0.2 % ophthalmic solution 1 drop.           ALLERGIES:  Allergies   Allergen Reactions    Beta Adrenergic Blockers Other (See Comments)     Patient is on allergy injections, Beta Blockers contraindicated. If medically necessary, it is OK to prescribe a Beta Blocker, but the allergy injections will need to be discontinued.     No Clinical Screening - See Comments      Other reaction(s): " Other, see comments  Patient is on allergy injections, Beta Blockers contraindicated. If medically necessary, it is OK to prescribe a Beta Blocker, but the allergy injections will need to be discontinued.

## 2025-01-13 NOTE — LETTER
1/13/2025    [facility]  [address]  [facility] [address]    RE: Mynor Whittington       Dear Colleague,     I had the pleasure of seeing Mynor Whittington in the [facility].      SERVICE DATE: January 13, 2025      DIAGNOSES/ASSESSMENT:    Incidental finding of asymptomatic type I Brugada pattern on recent EKG noted during fever from influenza and mild hypokalemia of 3.7.  No prior EKG for comparison.  Needs to see an electrophysiologist.  No personal history of palpitations or syncope but reports near syncope during recent influenza.  No known family history of arrhythmia, sudden death or cardiac problems.  Type 2 Diabetes Mellitus. Managed with metformin. Creatinine 0.9.    PLAN:    Refer to electrophysiologist for Brugada Syndrome evaluation.  Discussed with patient that he might require further testing including cardiac imaging such as echocardiogram, provocative testing.  Advised him to avoid over-the-counter decongestions (typically does not use any).  Aggressively manage fever with antipyretics such as Tylenol.      Mickie Rolon MD  Cardiology      REFERRING PROVIDER:  Zainab Carnes MD  [facility]  [address] [place] [address]      REASON FOR VISIT:  Type I Brugada pattern on EKG.    HISTORY OF PRESENT ILLNESS:  Mynor Whittington is a 39-year-old male, new to cardiology.  Following a recent ER visit for influenza, he was incidentally noted to have type I Brugada pattern on electrocardiogram, and referred to see heart rhythm specialist.  Due to scheduling error, patient has an appointment with me in General Cardiology today.    Patient is known to have asthma, type 2 diabetes mellitus (on metformin).  No prior cardiac diagnosis.  [place] works as a .  No syncope reported, though he felt close to fainting during his recent influenza episode.  No tobacco,  "alcohol or drug use.    Family history significant for father who  from alcohol-related issues. No sudden deaths or known heart problems in the family. Patient has 6 siblings, all alive with no known medical issues.  Most of his family resides in [facility].    BP: 100/67 mmHg, HR: 73, Height: 5'5\", Weight: 175 lbs, BMI: 29. Regular heart sounds, no murmurs, no carotid bruits. Lungs clear. No LE edema.    EKG (1/3/2025): Sinus tachycardia 120 (done in the ER when he had acute influenza), left axis deviation, type 1 Brugada pattern, QTc 440 ms.  No prior EKG for comparison.    Labs (1/3/2025): Normal renal panel with a creatinine of 0.9, potassium 3.7, sodium 139, normal CBC with a hemoglobin of 15.3.      New patient.  No charge visit.      This note was completed in part using dictation via the Dragon voice recognition software. Some word and grammatical errors may occur and must be interpreted in the appropriate clinical context. If there are any questions pertaining to this issue, please contact me for further clarification.     Orders Placed This Encounter   Procedures     Follow-Up with Cardiology         Vitals: /67   Pulse 73   Ht 1.651 m (5' 5\")   Wt 79.6 kg (175 lb 6.4 oz)   BMI 29.19 kg/m        CURRENT MEDICATIONS:  Current Outpatient Medications   Medication Sig Dispense Refill     albuterol (PROAIR HFA/PROVENTIL HFA/VENTOLIN HFA) 108 (90 Base) MCG/ACT inhaler Inhale 2 puffs into the lungs every 4 hours as needed.       ibuprofen (ADVIL/MOTRIN) 600 MG tablet Take 1 tablet (600 mg) by mouth every 6 hours as needed for fever. 30 tablet 0     ketoconazole (NIZORAL) 2 % external cream Apply topically 2 times daily.       metFORMIN (GLUCOPHAGE) 500 MG tablet Take 500 mg by mouth.       mometasone (NASONEX) 50 MCG/ACT nasal spray 1-2 sprays.       olopatadine (PATADAY) 0.2 % ophthalmic solution 1 drop.           ALLERGIES:  Allergies   Allergen Reactions     Beta Adrenergic Blockers Other (See " Comments)     Patient is on allergy injections, Beta Blockers contraindicated. If medically necessary, it is OK to prescribe a Beta Blocker, but the allergy injections will need to be discontinued.      No Clinical Screening - See Comments      Other reaction(s): Other, see comments  Patient is on allergy injections, Beta Blockers contraindicated. If medically necessary, it is OK to prescribe a Beta Blocker, but the allergy injections will need to be discontinued.              Thank you for allowing me to participate in the care of your patient.      Sincerely,     Mickie Rolon MD     [facility]  cc:   Zainab Carnes MD  [facility]  [address] [place] [address]

## 2025-01-27 ENCOUNTER — TELEPHONE (OUTPATIENT)
Dept: CARDIOLOGY | Facility: CLINIC | Age: 40
End: 2025-01-27
Payer: COMMERCIAL

## 2025-01-27 NOTE — TELEPHONE ENCOUNTER
No testing was recommended prior to EP consult per Dr Rolon's note, may be decided at time of next appointment. Called patient and reviewed this. He expressed understanding.

## 2025-01-27 NOTE — TELEPHONE ENCOUNTER
Health Call Center    Phone Message    May a detailed message be left on voicemail: yes     Reason for Call: Other: Patient stated from their last visit, they think they were supposed to have some testings done before seeing EP but can't recall if provider recommended to have this done prior or after appt and not sure what kind of testings. Please call patient back to further discuss.     Action Taken: Other: Cardiology    Travel Screening: Not Applicable     Thank you!  [facility]

## 2025-02-24 ENCOUNTER — OFFICE VISIT (OUTPATIENT)
Dept: CARDIOLOGY | Facility: CLINIC | Age: 40
End: 2025-02-24
Attending: INTERNAL MEDICINE
Payer: COMMERCIAL

## 2025-02-24 VITALS
SYSTOLIC BLOOD PRESSURE: 111 MMHG | WEIGHT: 172 LBS | HEIGHT: 65 IN | OXYGEN SATURATION: 100 % | DIASTOLIC BLOOD PRESSURE: 69 MMHG | BODY MASS INDEX: 28.66 KG/M2 | HEART RATE: 75 BPM | RESPIRATION RATE: 18 BRPM

## 2025-02-24 DIAGNOSIS — I49.8 BRUGADA PATTERN ON ELECTROCARDIOGRAM: ICD-10-CM

## 2025-02-24 PROCEDURE — 99203 OFFICE O/P NEW LOW 30 MIN: CPT | Performed by: INTERNAL MEDICINE

## 2025-02-24 PROCEDURE — G2211 COMPLEX E/M VISIT ADD ON: HCPCS | Performed by: INTERNAL MEDICINE

## 2025-02-24 PROCEDURE — 93000 ELECTROCARDIOGRAM COMPLETE: CPT | Performed by: INTERNAL MEDICINE

## 2025-02-24 NOTE — LETTER
2/24/2025    [facility]  [address]  [facility] [address]    RE: Mynor Whittington       Dear Colleague,     I had the pleasure of seeing Mynor Whittington in the [facility].    Electrophysiology Clinic Progress Note    Mynor Whittington MRN# [id number]   YOB: 1985 Age: 39 year old     Primary cardiologist: Dr. Rolon         Assessment and Plan   Delightful 38 yo with asthma but otherwise in relative good state of health who was eval at the [facility] ED for sob and coughing along with dizziness the next day. He was tested positive for Influenza A. ECG shows Brugada type I pattern. Reportedly to be febrile at home. Low grade fever noted in ED at 99.8. felt quite sick for 4 days before ED visit. Back to himself now. 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 No time for exercise or on a healthy diet. Recently prescribed statins and DM for  and glycosylated H1C 6.4%. does not want to take them yet.    Youngest sibling of 7. None have syncope or sudden death. Parents are healthy without syncope as well as first cousins. Denies palpitations. No h/o syncope. First time felt dizzy when had influenza.     Pt does not appear to have symptoms suggestive of arrhythmia cause. ECG today shows nsr without Brugada pattern. Pt does not Brugada syndrome but pattern due to the lack of any symptoms. Fever is a common trigger in manifesting Brugada pattern on ECG. Emphasize the importance of taking antipyretic when having fever and avoid Na channel blocker or certain type of antipsychotic drugs. I wrote down the website (brugadadrugs.org) for him to look up.  In the meantime will mail out 7 days zio. Regarding his hyperlipidemia and ?DM. Advised him to take a deep dive at aggressive lifestyle changes including reducing working hours, lose weight, exercise and eating a healthy diet. Recheck ldl and dm in  "6 months. Informed him to see me should he have palpitations or near-syncope or syncope.      The longitudinal plan of care of the diagnosis(es)/condition (s) as documented were addressed during this visit. Due to the added complexity in care, I will continue to support the patient in the subsequent management and with ongoing continuity of care.       Review of Systems     12-pt ROS is negative except for as noted in the HPI.          Physical Exam     Vitals: There were no vitals taken for this visit.  Wt Readings from Last 10 Encounters:   01/13/25 79.6 kg (175 lb 6.4 oz)   04/29/17 74.8 kg (165 lb)       Constitutional:  Patient is pleasant, alert, cooperative, and in NAD.  HEENT:  NCAT. PERRLA. EOM's intact.   Neck:  CVP appears normal. No carotid bruits.   Pulmonary: Normal respiratory effort. CTAB.   Cardiac: RRR, normal S1/S2, no S3/S4, no murmur or rub.   Abdomen:  Non-tender abdomen, no hepatosplenomegaly appreciated.   Vascular: Pulses in the upper and lower extremities are 2+ and equal bilaterally.  Extremities: No edema, erythema, cyanosis or tenderness appreciated.  Skin:  No rashes or lesions appreciated.   Neurological:  No gross motor or sensory deficits.   Psych: Appropriate affect.          Data   Labs reviewed:  No lab results found.    Invalid input(s): \"CMP\", \"CBC\"    Lab Results   Component Value Date    WBC 5.6 01/03/2025    RBC 5.35 01/03/2025    HGB 15.3 01/03/2025    HCT 44.2 01/03/2025    MCV 83 01/03/2025    MCH 28.6 01/03/2025    MCHC 34.6 01/03/2025    RDW 12.9 01/03/2025     01/03/2025       Lab Results   Component Value Date     01/03/2025    POTASSIUM 3.7 01/03/2025    CHLORIDE 103 01/03/2025    CO2 24 01/03/2025    ANIONGAP 12 01/03/2025     (H) 01/03/2025    BUN 14.5 01/03/2025    CR 0.96 01/03/2025    GFRESTIMATED >90 01/03/2025    BLANCA 8.9 01/03/2025      Lab Results   Component Value Date    AST 22 01/03/2025    ALT 37 01/03/2025       No results found for: " "\"A1C\"    No results found for: \"INR\"         Problem List   There is no problem list on file for this patient.           Medications     Current Outpatient Medications   Medication Sig Dispense Refill     albuterol (PROAIR HFA/PROVENTIL HFA/VENTOLIN HFA) 108 (90 Base) MCG/ACT inhaler Inhale 2 puffs into the lungs every 4 hours as needed. (Patient not taking: Reported on 2/24/2025)       ibuprofen (ADVIL/MOTRIN) 600 MG tablet Take 1 tablet (600 mg) by mouth every 6 hours as needed for fever. (Patient not taking: Reported on 2/24/2025) 30 tablet 0     ketoconazole (NIZORAL) 2 % external cream Apply topically 2 times daily. (Patient not taking: Reported on 2/24/2025)       metFORMIN (GLUCOPHAGE) 500 MG tablet Take 500 mg by mouth. (Patient not taking: Reported on 2/24/2025)       mometasone (NASONEX) 50 MCG/ACT nasal spray 1-2 sprays. (Patient not taking: Reported on 2/24/2025)       olopatadine (PATADAY) 0.2 % ophthalmic solution 1 drop. (Patient not taking: Reported on 2/24/2025)              Past Medical History     Past Medical History:   Diagnosis Date     Hyperlipidemia      Prediabetes      Uncomplicated asthma      No past surgical history on file.  No family history on file.  Social History     Socioeconomic History     Marital status: Single     Spouse name: Not on file     Number of children: Not on file     Years of education: Not on file     Highest education level: Not on file   Occupational History     Not on file   Tobacco Use     Smoking status: Never     Smokeless tobacco: Not on file   Substance and Sexual Activity     Alcohol use: No     Drug use: No     Sexual activity: Not on file   Other Topics Concern     Not on file   Social History Narrative     Not on file     Social Drivers of Health     Financial Resource Strain: Not on File (8/25/2019)    Received from [facility]    Financial Resource Strain      Financial Resource Strain: 0   Food Insecurity: Not on File (9/26/2024)    Received from [facility]    " Food Insecurity      Food: 0   Transportation Needs: Not on File (2019)    Received from [facility]    Transportation Needs      Transportation: 0   Physical Activity: Not on File (2019)    Received from [facility]    Physical Activity      Physical Activity: 0   Stress: Not on File (2019)    Received from [facility]    Stress      Stress: 0   Social Connections: Not on File (2024)    Received from [facility]    Social Connections      Connectedness: 0   Interpersonal Safety: Not on file   Housing Stability: Not on File (2019)    Received from [facility]    Housing Stability      Housin            Allergies   Beta adrenergic blockers and No clinical screening - see comments    Today's clinic visit entailed:  The following tests were independently interpreted by me as noted in my documentation: ecg  30 minutes spent by me on the date of the encounter doing chart review, history and exam, documentation and further activities per the note  Provider  Link to [facility] Help Grid     The level of medical decision making during this visit was of moderate complexity.       Thank you for allowing me to participate in the care of your patient.      Sincerely,     Nadeem Montanez MD     [facility]  cc:   Mickie Rolon MD  [address] [place] [id number]

## 2025-02-24 NOTE — PROGRESS NOTES
Electrophysiology Clinic Progress Note    Mynor Whittington MRN# [id number]   YOB: 1985 Age: 39 year old     Primary cardiologist: Dr. Rolon         Assessment and Plan   Delightful 38 yo with asthma but otherwise in relative good state of health who was eval at the [facility] ED for sob and coughing along with dizziness the next day. He was tested positive for Influenza A. ECG shows Brugada type I pattern. Reportedly to be febrile at home. Low grade fever noted in ED at 99.8. felt quite sick for 4 days before ED visit. Back to himself now. 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 No time for exercise or on a healthy diet. Recently prescribed statins and DM for  and glycosylated H1C 6.4%. does not want to take them yet.    Youngest sibling of 7. None have syncope or sudden death. Parents are healthy without syncope as well as first cousins. Denies palpitations. No h/o syncope. First time felt dizzy when had influenza.     Pt does not appear to have symptoms suggestive of arrhythmia cause. ECG today shows nsr without Brugada pattern. Pt does not Brugada syndrome but pattern due to the lack of any symptoms. Fever is a common trigger in manifesting Brugada pattern on ECG. Emphasize the importance of taking antipyretic when having fever and avoid Na channel blocker or certain type of antipsychotic drugs. I wrote down the website (brugadadrugs.org) for him to look up.  In the meantime will mail out 7 days zio. Regarding his hyperlipidemia and ?DM. Advised him to take a deep dive at aggressive lifestyle changes including reducing working hours, lose weight, exercise and eating a healthy diet. Recheck ldl and dm in 6 months. Informed him to see me should he have palpitations or near-syncope or syncope.      The longitudinal plan of care of the diagnosis(es)/condition (s) as documented were addressed during this visit. Due to the added complexity in care, I will  "continue to support the patient in the subsequent management and with ongoing continuity of care.       Review of Systems     12-pt ROS is negative except for as noted in the HPI.          Physical Exam     Vitals: There were no vitals taken for this visit.  Wt Readings from Last 10 Encounters:   01/13/25 79.6 kg (175 lb 6.4 oz)   04/29/17 74.8 kg (165 lb)       Constitutional:  Patient is pleasant, alert, cooperative, and in NAD.  HEENT:  NCAT. PERRLA. EOM's intact.   Neck:  CVP appears normal. No carotid bruits.   Pulmonary: Normal respiratory effort. CTAB.   Cardiac: RRR, normal S1/S2, no S3/S4, no murmur or rub.   Abdomen:  Non-tender abdomen, no hepatosplenomegaly appreciated.   Vascular: Pulses in the upper and lower extremities are 2+ and equal bilaterally.  Extremities: No edema, erythema, cyanosis or tenderness appreciated.  Skin:  No rashes or lesions appreciated.   Neurological:  No gross motor or sensory deficits.   Psych: Appropriate affect.          Data   Labs reviewed:  No lab results found.    Invalid input(s): \"CMP\", \"CBC\"    Lab Results   Component Value Date    WBC 5.6 01/03/2025    RBC 5.35 01/03/2025    HGB 15.3 01/03/2025    HCT 44.2 01/03/2025    MCV 83 01/03/2025    MCH 28.6 01/03/2025    MCHC 34.6 01/03/2025    RDW 12.9 01/03/2025     01/03/2025       Lab Results   Component Value Date     01/03/2025    POTASSIUM 3.7 01/03/2025    CHLORIDE 103 01/03/2025    CO2 24 01/03/2025    ANIONGAP 12 01/03/2025     (H) 01/03/2025    BUN 14.5 01/03/2025    CR 0.96 01/03/2025    GFRESTIMATED >90 01/03/2025    BLANCA 8.9 01/03/2025      Lab Results   Component Value Date    AST 22 01/03/2025    ALT 37 01/03/2025       No results found for: \"A1C\"    No results found for: \"INR\"         Problem List   There is no problem list on file for this patient.           Medications     Current Outpatient Medications   Medication Sig Dispense Refill    albuterol (PROAIR HFA/PROVENTIL HFA/VENTOLIN " HFA) 108 (90 Base) MCG/ACT inhaler Inhale 2 puffs into the lungs every 4 hours as needed. (Patient not taking: Reported on 2/24/2025)      ibuprofen (ADVIL/MOTRIN) 600 MG tablet Take 1 tablet (600 mg) by mouth every 6 hours as needed for fever. (Patient not taking: Reported on 2/24/2025) 30 tablet 0    ketoconazole (NIZORAL) 2 % external cream Apply topically 2 times daily. (Patient not taking: Reported on 2/24/2025)      metFORMIN (GLUCOPHAGE) 500 MG tablet Take 500 mg by mouth. (Patient not taking: Reported on 2/24/2025)      mometasone (NASONEX) 50 MCG/ACT nasal spray 1-2 sprays. (Patient not taking: Reported on 2/24/2025)      olopatadine (PATADAY) 0.2 % ophthalmic solution 1 drop. (Patient not taking: Reported on 2/24/2025)              Past Medical History     Past Medical History:   Diagnosis Date    Hyperlipidemia     Prediabetes     Uncomplicated asthma      No past surgical history on file.  No family history on file.  Social History     Socioeconomic History    Marital status: Single     Spouse name: Not on file    Number of children: Not on file    Years of education: Not on file    Highest education level: Not on file   Occupational History    Not on file   Tobacco Use    Smoking status: Never    Smokeless tobacco: Not on file   Substance and Sexual Activity    Alcohol use: No    Drug use: No    Sexual activity: Not on file   Other Topics Concern    Not on file   Social History Narrative    Not on file     Social Drivers of Health     Financial Resource Strain: Not on File (8/25/2019)    Received from [facility]    Financial Resource Strain     Financial Resource Strain: 0   Food Insecurity: Not on File (9/26/2024)    Received from [facility]    Food Insecurity     Food: 0   Transportation Needs: Not on File (8/25/2019)    Received from [facility]    Transportation Needs     Transportation: 0   Physical Activity: Not on File (8/25/2019)    Received from [facility]    Physical Activity     Physical Activity: 0   Stress: Not  on File (2019)    Received from [facility]    Stress     Stress: 0   Social Connections: Not on File (2024)    Received from [facility]    Social Connections     Connectedness: 0   Interpersonal Safety: Not on file   Housing Stability: Not on File (2019)    Received from [facility]    Housing Stability     Housin            Allergies   Beta adrenergic blockers and No clinical screening - see comments    Today's clinic visit entailed:  The following tests were independently interpreted by me as noted in my documentation: ecg  30 minutes spent by me on the date of the encounter doing chart review, history and exam, documentation and further activities per the note  Provider  Link to MDM Help Grid     The level of medical decision making during this visit was of moderate complexity.